# Patient Record
Sex: MALE | Race: OTHER | NOT HISPANIC OR LATINO | URBAN - METROPOLITAN AREA
[De-identification: names, ages, dates, MRNs, and addresses within clinical notes are randomized per-mention and may not be internally consistent; named-entity substitution may affect disease eponyms.]

---

## 2018-10-19 ENCOUNTER — INPATIENT (INPATIENT)
Facility: HOSPITAL | Age: 38
LOS: 1 days | Discharge: HOME | End: 2018-10-21
Attending: INTERNAL MEDICINE | Admitting: INTERNAL MEDICINE
Payer: MEDICAID

## 2018-10-19 VITALS
HEART RATE: 126 BPM | TEMPERATURE: 98 F | OXYGEN SATURATION: 100 % | RESPIRATION RATE: 26 BRPM | SYSTOLIC BLOOD PRESSURE: 139 MMHG | DIASTOLIC BLOOD PRESSURE: 84 MMHG

## 2018-10-19 DIAGNOSIS — J45.901 UNSPECIFIED ASTHMA WITH (ACUTE) EXACERBATION: ICD-10-CM

## 2018-10-19 LAB
ALBUMIN SERPL ELPH-MCNC: 4.6 G/DL — SIGNIFICANT CHANGE UP (ref 3.3–5)
ALP SERPL-CCNC: 84 U/L — SIGNIFICANT CHANGE UP (ref 40–120)
ALT FLD-CCNC: 35 U/L — SIGNIFICANT CHANGE UP (ref 4–41)
ANISOCYTOSIS BLD QL: SLIGHT — SIGNIFICANT CHANGE UP
APTT BLD: 30.6 SEC — SIGNIFICANT CHANGE UP (ref 27.5–37.4)
AST SERPL-CCNC: 21 U/L — SIGNIFICANT CHANGE UP (ref 4–40)
B PERT DNA SPEC QL NAA+PROBE: SIGNIFICANT CHANGE UP
BASE EXCESS BLDV CALC-SCNC: -0.7 MMOL/L — SIGNIFICANT CHANGE UP
BASE EXCESS BLDV CALC-SCNC: 2.3 MMOL/L — SIGNIFICANT CHANGE UP
BASOPHILS # BLD AUTO: 0.03 K/UL — SIGNIFICANT CHANGE UP (ref 0–0.2)
BASOPHILS NFR BLD AUTO: 0.2 % — SIGNIFICANT CHANGE UP (ref 0–2)
BASOPHILS NFR SPEC: 0 % — SIGNIFICANT CHANGE UP (ref 0–2)
BILIRUB SERPL-MCNC: 0.3 MG/DL — SIGNIFICANT CHANGE UP (ref 0.2–1.2)
BLASTS # FLD: 0 % — SIGNIFICANT CHANGE UP (ref 0–0)
BLOOD GAS VENOUS - CREATININE: 0.77 MG/DL — SIGNIFICANT CHANGE UP (ref 0.5–1.3)
BLOOD GAS VENOUS - CREATININE: 0.81 MG/DL — SIGNIFICANT CHANGE UP (ref 0.5–1.3)
BUN SERPL-MCNC: 10 MG/DL — SIGNIFICANT CHANGE UP (ref 7–23)
C PNEUM DNA SPEC QL NAA+PROBE: NOT DETECTED — SIGNIFICANT CHANGE UP
CALCIUM SERPL-MCNC: 9.9 MG/DL — SIGNIFICANT CHANGE UP (ref 8.4–10.5)
CHLORIDE BLDV-SCNC: 107 MMOL/L — SIGNIFICANT CHANGE UP (ref 96–108)
CHLORIDE BLDV-SCNC: 109 MMOL/L — HIGH (ref 96–108)
CHLORIDE SERPL-SCNC: 99 MMOL/L — SIGNIFICANT CHANGE UP (ref 98–107)
CO2 SERPL-SCNC: 24 MMOL/L — SIGNIFICANT CHANGE UP (ref 22–31)
CREAT SERPL-MCNC: 0.91 MG/DL — SIGNIFICANT CHANGE UP (ref 0.5–1.3)
EOSINOPHIL # BLD AUTO: 0.01 K/UL — SIGNIFICANT CHANGE UP (ref 0–0.5)
EOSINOPHIL NFR BLD AUTO: 0.1 % — SIGNIFICANT CHANGE UP (ref 0–6)
EOSINOPHIL NFR FLD: 0.9 % — SIGNIFICANT CHANGE UP (ref 0–6)
FLUAV H1 2009 PAND RNA SPEC QL NAA+PROBE: NOT DETECTED — SIGNIFICANT CHANGE UP
FLUAV H1 RNA SPEC QL NAA+PROBE: NOT DETECTED — SIGNIFICANT CHANGE UP
FLUAV H3 RNA SPEC QL NAA+PROBE: NOT DETECTED — SIGNIFICANT CHANGE UP
FLUAV SUBTYP SPEC NAA+PROBE: SIGNIFICANT CHANGE UP
FLUBV RNA SPEC QL NAA+PROBE: NOT DETECTED — SIGNIFICANT CHANGE UP
GAS PNL BLDV: 135 MMOL/L — LOW (ref 136–146)
GAS PNL BLDV: 136 MMOL/L — SIGNIFICANT CHANGE UP (ref 136–146)
GLUCOSE BLDV-MCNC: 126 — HIGH (ref 70–99)
GLUCOSE BLDV-MCNC: 149 — HIGH (ref 70–99)
GLUCOSE SERPL-MCNC: 121 MG/DL — HIGH (ref 70–99)
HADV DNA SPEC QL NAA+PROBE: NOT DETECTED — SIGNIFICANT CHANGE UP
HCO3 BLDV-SCNC: 23 MMOL/L — SIGNIFICANT CHANGE UP (ref 20–27)
HCO3 BLDV-SCNC: 24 MMOL/L — SIGNIFICANT CHANGE UP (ref 20–27)
HCOV 229E RNA SPEC QL NAA+PROBE: NOT DETECTED — SIGNIFICANT CHANGE UP
HCOV HKU1 RNA SPEC QL NAA+PROBE: NOT DETECTED — SIGNIFICANT CHANGE UP
HCOV NL63 RNA SPEC QL NAA+PROBE: NOT DETECTED — SIGNIFICANT CHANGE UP
HCOV OC43 RNA SPEC QL NAA+PROBE: NOT DETECTED — SIGNIFICANT CHANGE UP
HCT VFR BLD CALC: 48.5 % — SIGNIFICANT CHANGE UP (ref 39–50)
HCT VFR BLDV CALC: 45.5 % — SIGNIFICANT CHANGE UP (ref 39–51)
HCT VFR BLDV CALC: 51.5 % — HIGH (ref 39–51)
HGB BLD-MCNC: 16.2 G/DL — SIGNIFICANT CHANGE UP (ref 13–17)
HGB BLDV-MCNC: 14.9 G/DL — SIGNIFICANT CHANGE UP (ref 13–17)
HGB BLDV-MCNC: 16.8 G/DL — SIGNIFICANT CHANGE UP (ref 13–17)
HMPV RNA SPEC QL NAA+PROBE: NOT DETECTED — SIGNIFICANT CHANGE UP
HPIV1 RNA SPEC QL NAA+PROBE: NOT DETECTED — SIGNIFICANT CHANGE UP
HPIV2 RNA SPEC QL NAA+PROBE: NOT DETECTED — SIGNIFICANT CHANGE UP
HPIV3 RNA SPEC QL NAA+PROBE: NOT DETECTED — SIGNIFICANT CHANGE UP
HPIV4 RNA SPEC QL NAA+PROBE: NOT DETECTED — SIGNIFICANT CHANGE UP
IMM GRANULOCYTES # BLD AUTO: 0.06 # — SIGNIFICANT CHANGE UP
IMM GRANULOCYTES NFR BLD AUTO: 0.4 % — SIGNIFICANT CHANGE UP (ref 0–1.5)
INR BLD: 1.11 — SIGNIFICANT CHANGE UP (ref 0.88–1.17)
LACTATE BLDV-MCNC: 4 MMOL/L — CRITICAL HIGH (ref 0.5–2)
LACTATE BLDV-MCNC: 4.1 MMOL/L — CRITICAL HIGH (ref 0.5–2)
LYMPHOCYTES # BLD AUTO: 0.36 K/UL — LOW (ref 1–3.3)
LYMPHOCYTES # BLD AUTO: 2.4 % — LOW (ref 13–44)
LYMPHOCYTES NFR SPEC AUTO: 1.8 % — LOW (ref 13–44)
M PNEUMO DNA SPEC QL NAA+PROBE: NOT DETECTED — SIGNIFICANT CHANGE UP
MACROCYTES BLD QL: SLIGHT — SIGNIFICANT CHANGE UP
MCHC RBC-ENTMCNC: 30 PG — SIGNIFICANT CHANGE UP (ref 27–34)
MCHC RBC-ENTMCNC: 33.4 % — SIGNIFICANT CHANGE UP (ref 32–36)
MCV RBC AUTO: 89.8 FL — SIGNIFICANT CHANGE UP (ref 80–100)
METAMYELOCYTES # FLD: 0 % — SIGNIFICANT CHANGE UP (ref 0–1)
MONOCYTES # BLD AUTO: 0.18 K/UL — SIGNIFICANT CHANGE UP (ref 0–0.9)
MONOCYTES NFR BLD AUTO: 1.2 % — LOW (ref 2–14)
MONOCYTES NFR BLD: 1.7 % — LOW (ref 2–9)
MYELOCYTES NFR BLD: 0 % — SIGNIFICANT CHANGE UP (ref 0–0)
NEUTROPHIL AB SER-ACNC: 92.2 % — HIGH (ref 43–77)
NEUTROPHILS # BLD AUTO: 14.61 K/UL — HIGH (ref 1.8–7.4)
NEUTROPHILS NFR BLD AUTO: 95.7 % — HIGH (ref 43–77)
NEUTS BAND # BLD: 1.7 % — SIGNIFICANT CHANGE UP (ref 0–6)
NRBC # FLD: 0 — SIGNIFICANT CHANGE UP
OTHER - HEMATOLOGY %: 0 — SIGNIFICANT CHANGE UP
PCO2 BLDV: 49 MMHG — SIGNIFICANT CHANGE UP (ref 41–51)
PCO2 BLDV: 53 MMHG — HIGH (ref 41–51)
PH BLDV: 7.32 PH — SIGNIFICANT CHANGE UP (ref 7.32–7.43)
PH BLDV: 7.34 PH — SIGNIFICANT CHANGE UP (ref 7.32–7.43)
PLATELET # BLD AUTO: 383 K/UL — SIGNIFICANT CHANGE UP (ref 150–400)
PLATELET COUNT - ESTIMATE: NORMAL — SIGNIFICANT CHANGE UP
PMV BLD: 9.7 FL — SIGNIFICANT CHANGE UP (ref 7–13)
PO2 BLDV: 39 MMHG — SIGNIFICANT CHANGE UP (ref 35–40)
PO2 BLDV: 50 MMHG — HIGH (ref 35–40)
POTASSIUM BLDV-SCNC: 3.8 MMOL/L — SIGNIFICANT CHANGE UP (ref 3.4–4.5)
POTASSIUM BLDV-SCNC: 3.8 MMOL/L — SIGNIFICANT CHANGE UP (ref 3.4–4.5)
POTASSIUM SERPL-MCNC: 4.1 MMOL/L — SIGNIFICANT CHANGE UP (ref 3.5–5.3)
POTASSIUM SERPL-SCNC: 4.1 MMOL/L — SIGNIFICANT CHANGE UP (ref 3.5–5.3)
PROMYELOCYTES # FLD: 0 % — SIGNIFICANT CHANGE UP (ref 0–0)
PROT SERPL-MCNC: 8 G/DL — SIGNIFICANT CHANGE UP (ref 6–8.3)
PROTHROM AB SERPL-ACNC: 12.3 SEC — SIGNIFICANT CHANGE UP (ref 9.8–13.1)
RBC # BLD: 5.4 M/UL — SIGNIFICANT CHANGE UP (ref 4.2–5.8)
RBC # FLD: 12.8 % — SIGNIFICANT CHANGE UP (ref 10.3–14.5)
RSV RNA SPEC QL NAA+PROBE: NOT DETECTED — SIGNIFICANT CHANGE UP
RV+EV RNA SPEC QL NAA+PROBE: NOT DETECTED — SIGNIFICANT CHANGE UP
SAO2 % BLDV: 68 % — SIGNIFICANT CHANGE UP (ref 60–85)
SAO2 % BLDV: 79.9 % — SIGNIFICANT CHANGE UP (ref 60–85)
SODIUM SERPL-SCNC: 140 MMOL/L — SIGNIFICANT CHANGE UP (ref 135–145)
VARIANT LYMPHS # BLD: 1.7 % — SIGNIFICANT CHANGE UP
WBC # BLD: 15.25 K/UL — HIGH (ref 3.8–10.5)
WBC # FLD AUTO: 15.25 K/UL — HIGH (ref 3.8–10.5)

## 2018-10-19 PROCEDURE — 99223 1ST HOSP IP/OBS HIGH 75: CPT | Mod: GC

## 2018-10-19 PROCEDURE — 71046 X-RAY EXAM CHEST 2 VIEWS: CPT | Mod: 26

## 2018-10-19 RX ORDER — EPINEPHRINE 0.3 MG/.3ML
0.3 INJECTION INTRAMUSCULAR; SUBCUTANEOUS ONCE
Qty: 0 | Refills: 0 | Status: COMPLETED | OUTPATIENT
Start: 2018-10-19 | End: 2018-10-19

## 2018-10-19 RX ORDER — INFLUENZA VIRUS VACCINE 15; 15; 15; 15 UG/.5ML; UG/.5ML; UG/.5ML; UG/.5ML
0.5 SUSPENSION INTRAMUSCULAR ONCE
Qty: 0 | Refills: 0 | Status: DISCONTINUED | OUTPATIENT
Start: 2018-10-19 | End: 2018-10-21

## 2018-10-19 RX ORDER — ALBUTEROL 90 UG/1
2.5 AEROSOL, METERED ORAL ONCE
Qty: 0 | Refills: 0 | Status: COMPLETED | OUTPATIENT
Start: 2018-10-19 | End: 2018-10-19

## 2018-10-19 RX ORDER — MAGNESIUM SULFATE 500 MG/ML
2 VIAL (ML) INJECTION ONCE
Qty: 0 | Refills: 0 | Status: COMPLETED | OUTPATIENT
Start: 2018-10-19 | End: 2018-10-19

## 2018-10-19 RX ORDER — IPRATROPIUM/ALBUTEROL SULFATE 18-103MCG
3 AEROSOL WITH ADAPTER (GRAM) INHALATION EVERY 6 HOURS
Qty: 0 | Refills: 0 | Status: DISCONTINUED | OUTPATIENT
Start: 2018-10-19 | End: 2018-10-19

## 2018-10-19 RX ORDER — ENOXAPARIN SODIUM 100 MG/ML
40 INJECTION SUBCUTANEOUS DAILY
Qty: 0 | Refills: 0 | Status: DISCONTINUED | OUTPATIENT
Start: 2018-10-19 | End: 2018-10-21

## 2018-10-19 RX ORDER — IPRATROPIUM/ALBUTEROL SULFATE 18-103MCG
3 AEROSOL WITH ADAPTER (GRAM) INHALATION EVERY 4 HOURS
Qty: 0 | Refills: 0 | Status: DISCONTINUED | OUTPATIENT
Start: 2018-10-19 | End: 2018-10-21

## 2018-10-19 RX ORDER — IPRATROPIUM/ALBUTEROL SULFATE 18-103MCG
3 AEROSOL WITH ADAPTER (GRAM) INHALATION
Qty: 0 | Refills: 0 | Status: COMPLETED | OUTPATIENT
Start: 2018-10-19 | End: 2018-10-19

## 2018-10-19 RX ORDER — SODIUM CHLORIDE 9 MG/ML
1000 INJECTION INTRAMUSCULAR; INTRAVENOUS; SUBCUTANEOUS ONCE
Qty: 0 | Refills: 0 | Status: COMPLETED | OUTPATIENT
Start: 2018-10-19 | End: 2018-10-19

## 2018-10-19 RX ORDER — TIOTROPIUM BROMIDE 18 UG/1
1 CAPSULE ORAL; RESPIRATORY (INHALATION) DAILY
Qty: 0 | Refills: 0 | Status: DISCONTINUED | OUTPATIENT
Start: 2018-10-19 | End: 2018-10-21

## 2018-10-19 RX ORDER — BUDESONIDE AND FORMOTEROL FUMARATE DIHYDRATE 160; 4.5 UG/1; UG/1
2 AEROSOL RESPIRATORY (INHALATION)
Qty: 0 | Refills: 0 | Status: DISCONTINUED | OUTPATIENT
Start: 2018-10-19 | End: 2018-10-21

## 2018-10-19 RX ORDER — SODIUM CHLORIDE 0.65 %
1 AEROSOL, SPRAY (ML) NASAL
Qty: 0 | Refills: 0 | Status: DISCONTINUED | OUTPATIENT
Start: 2018-10-19 | End: 2018-10-21

## 2018-10-19 RX ORDER — ALBUTEROL 90 UG/1
1 AEROSOL, METERED ORAL EVERY 4 HOURS
Qty: 0 | Refills: 0 | Status: DISCONTINUED | OUTPATIENT
Start: 2018-10-19 | End: 2018-10-21

## 2018-10-19 RX ADMIN — BUDESONIDE AND FORMOTEROL FUMARATE DIHYDRATE 2 PUFF(S): 160; 4.5 AEROSOL RESPIRATORY (INHALATION) at 13:18

## 2018-10-19 RX ADMIN — Medication 3 MILLILITER(S): at 04:24

## 2018-10-19 RX ADMIN — Medication 40 MILLIGRAM(S): at 17:23

## 2018-10-19 RX ADMIN — ENOXAPARIN SODIUM 40 MILLIGRAM(S): 100 INJECTION SUBCUTANEOUS at 13:17

## 2018-10-19 RX ADMIN — SODIUM CHLORIDE 3000 MILLILITER(S): 9 INJECTION INTRAMUSCULAR; INTRAVENOUS; SUBCUTANEOUS at 05:55

## 2018-10-19 RX ADMIN — SODIUM CHLORIDE 1000 MILLILITER(S): 9 INJECTION INTRAMUSCULAR; INTRAVENOUS; SUBCUTANEOUS at 04:23

## 2018-10-19 RX ADMIN — Medication 3 MILLILITER(S): at 10:01

## 2018-10-19 RX ADMIN — Medication 2 GRAM(S): at 05:52

## 2018-10-19 RX ADMIN — SODIUM CHLORIDE 1000 MILLILITER(S): 9 INJECTION INTRAMUSCULAR; INTRAVENOUS; SUBCUTANEOUS at 07:03

## 2018-10-19 RX ADMIN — Medication 3 MILLILITER(S): at 01:17

## 2018-10-19 RX ADMIN — Medication 3 MILLILITER(S): at 01:38

## 2018-10-19 RX ADMIN — ALBUTEROL 2.5 MILLIGRAM(S): 90 AEROSOL, METERED ORAL at 04:23

## 2018-10-19 RX ADMIN — Medication 3 MILLILITER(S): at 13:14

## 2018-10-19 RX ADMIN — Medication 125 MILLIGRAM(S): at 01:20

## 2018-10-19 RX ADMIN — BUDESONIDE AND FORMOTEROL FUMARATE DIHYDRATE 2 PUFF(S): 160; 4.5 AEROSOL RESPIRATORY (INHALATION) at 21:24

## 2018-10-19 RX ADMIN — Medication 3 MILLILITER(S): at 21:20

## 2018-10-19 RX ADMIN — SODIUM CHLORIDE 1000 MILLILITER(S): 9 INJECTION INTRAMUSCULAR; INTRAVENOUS; SUBCUTANEOUS at 05:52

## 2018-10-19 RX ADMIN — Medication 50 GRAM(S): at 04:07

## 2018-10-19 NOTE — ED ADULT TRIAGE NOTE - CHIEF COMPLAINT QUOTE
pt arrives c/o severe chest tightness for past two hours. pt is sweaty and clammy , looking pale. Says he has PMH of Asthma and had SOB and wheezing for past two days. He had been taking Salbutamol inhaler at home. pt placed on oxygen. EKG obtained and is brought directly to room 25.

## 2018-10-19 NOTE — DISCHARGE NOTE ADULT - MEDICATION SUMMARY - MEDICATIONS TO TAKE
I will START or STAY ON the medications listed below when I get home from the hospital:    predniSONE 20 mg oral tablet  -- 2 tab(s) by mouth once a day  -- Indication: For Asthma exacerbation    budesonide-formoterol 160 mcg-4.5 mcg/inh inhalation aerosol  -- 1 puff(s) inhaled 2 times a day   -- Indication: For Asthma exacerbation    albuterol 1.25 mg/3 mL (0.042%) inhalation solution  -- 3 milliliter(s) by nebulizer 3 times a day, As Needed for wheezing  -- For inhalation only.  It is very important that you take or use this exactly as directed.  Do not skip doses or discontinue unless directed by your doctor.  Obtain medical advice before taking any non-prescription drugs as some may affect the action of this medication.    -- Indication: For Asthma exacerbation    guaiFENesin 1200 mg oral tablet, extended release  -- 1 tab(s) by mouth every 12 hours  -- Indication: For Asthma exacerbation    montelukast 10 mg oral tablet  -- 1 tab(s) by mouth once a day  -- Indication: For Asthma exacerbation

## 2018-10-19 NOTE — DISCHARGE NOTE ADULT - CARE PLAN
Principal Discharge DX:	Moderate asthma with exacerbation, unspecified whether persistent Principal Discharge DX:	Moderate asthma with exacerbation, unspecified whether persistent  Goal:	Decreased wheezing  Assessment and plan of treatment:	You came into the hospital with wheezing and shortness of breath. You were treated for asthma exacerbation. You will be contacted to follow up with pulmonary doctors within 1 week of discharge

## 2018-10-19 NOTE — H&P ADULT - NSHPPHYSICALEXAM_GEN_ALL_CORE
GEN: NAD, speaking in full sentences, on BiPAP  HEENT: NC/AT, anicteric  Neck: No LAD  Pulmonary: Diffuse wheezing, no accessory muscle use  Cardiac: Tachycardic, regular, no murmurs appreciated  GI: soft, ntnd  Ext: No c/c/e  Neuro: Non-focal  Psych: appropriate affect

## 2018-10-19 NOTE — ED ADULT NURSE REASSESSMENT NOTE - NS ED NURSE REASSESS COMMENT FT1
flodahlia RN: Pt noted to be tachypneic; however pt denies SOB, difficulty breathing, chest pain; no signs of respiratory distress noted at this time. Pt is able to speak full clear sentences.

## 2018-10-19 NOTE — ED PROVIDER NOTE - MEDICAL DECISION MAKING DETAILS
moderate asthma exacerabtion w/ minimal improvement with nebs. improved with mg & bipap. will adm rcu

## 2018-10-19 NOTE — DISCHARGE NOTE ADULT - PLAN OF CARE
Decreased wheezing You came into the hospital with wheezing and shortness of breath. You were treated for asthma exacerbation. You will be contacted to follow up with pulmonary doctors within 1 week of discharge

## 2018-10-19 NOTE — ED PROVIDER NOTE - CARE PLAN
Principal Discharge DX:	Moderate asthma with exacerbation, unspecified whether persistent  Secondary Diagnosis:	URI (upper respiratory infection) Principal Discharge DX:	Moderate asthma with exacerbation, unspecified whether persistent  Secondary Diagnosis:	URI (upper respiratory infection)  Secondary Diagnosis:	Lactic acidosis

## 2018-10-19 NOTE — H&P ADULT - ASSESSMENT
36 yo male with a PMhx of asthma who presents with SOB and wheezing 2/2 acute asthma exacerbation requiring BiPAP for work of breathing    #Asthma exacerbation:  Patient with known history of asthma on unclear controller agent presents with asthma exacerbation requiring BiPAP for increased work of breathing. Possible triggers include recent viral infection vs change in weather. RVP negative; CXR clear. Labs with leukocytosis (after receiving steroids) but no clear evidence of bacterial infection--will monitor off abx.  -Solumedrol 40mg daily for now  -Duonebs Q6H PRN  -Albuterol Q2H PRN  -Start ICS/LABA  -BiPAP QHS for now  -Will need outpatient follow up    #Lactic acidosis  Suspect B-type lactic acidosis from IM epinephrine and albuterol,  -Repeat lactate    #VTE PPX: Lovenox    Barak Wilhelm MD, PGY4  Pulmonary and Critical Care Fellow  [phone]

## 2018-10-19 NOTE — DISCHARGE NOTE ADULT - PATIENT PORTAL LINK FT
You can access the [email or web address] Patient Portal, offered by [facility], by registering with the following website: [email or web address]

## 2018-10-19 NOTE — H&P ADULT - HISTORY OF PRESENT ILLNESS
38 yo male with a PMhx of asthma which has been well controlled over the last 6 years who presents with2-3 days of SOB and wheezing which he attributes to cold weather. Patient notes diagnosis of childhood asthma for which he takes an inhaler (unsure of the type and he gets his medications from [facility] due to the cost). He reports he does not like using a rescue inhaler because it makes him jittery. Over the last 2-3 days he notes increasing SOB and wheezing a/w a dry cough with minimal exertion. He notes that his "glands" in his neck were swollen several days ago and a runny nose but denies any fevers, chills, other URI symptoms, sputum production, n/v/d/c, abdominal pain, recent travel, sick contacts, LE edema. He reports that he stopped smoking both cigarettes and marijuana about 10 days ago. He has had prior ICU stays (last 6 years ago), but has never been intubated or required BiPAP.     In the ED he was given solumedrol, EpI IM, Duonebs, Albuterol, Mg. He was placed on BiPAP for increased work of breathing

## 2018-10-19 NOTE — H&P ADULT - NSHPSOCIALHISTORY_GEN_ALL_CORE
Born in   Occasional cigarettes and marijuana use (denies smoking more than a few cigarettes a day and reports stopping 10 days ago)  Etoh - previously drank 1 beer a day max; stopped etoh completely 10 days prior  Works in "[employer]

## 2018-10-19 NOTE — ED PROVIDER NOTE - PROGRESS NOTE DETAILS
Satnam: Pt speaking in full sentences, still wheezing and tacypneic, will give magnesium and continue tor reassess. Satnam: Pt still wheezing and tachypneic. Pt becomes significantly distressed with ambulation. magnesium running, will continue to reassess. Satnam: Pt tolerating bipap well. clinically improved. admitted to PCU awaiting a bed.

## 2018-10-19 NOTE — ED PROVIDER NOTE - ATTENDING CONTRIBUTION TO CARE
Satnam: 38 yo male with a h/o asthma- previous ICU admissions but no Bipap or intubations c/o non productive cough and SOB that began yesterday. + associated nasal congestion. No chest pain, LE pain or edema. No recent travel, immobilization or known sick contacts. Exam: GENERAL: well appearing, NAD, HEENT: MMM, PERRLA, CARDIO: + regular tachycardia, LUNGS: diffuse wheezing and atchypnea, but speaking in full sentences and no accessory muscle use. ABD: soft, nontender, BSx4 quadrants, no guarding or rigidity. EXT: No LE edema or calf TTP, 2+ distal pulses x 4 extremities. NEURO: AxOx3, SKIN: no rashes or lesions, well perfused A/P- 38 yo male with asthma exacerbation. will give steroids, nebs and reassess.

## 2018-10-19 NOTE — ED ADULT NURSE NOTE - OBJECTIVE STATEMENT
karl RN: pt received to room 25. complains of sob and chest tightness for past 2 hours, not relieved by home inhaler. pt tachycardic and tachypneic. O2 sat 100% on arrival to room. MD at bedside for eval. medicated as ordered. awaits xray in no acute distress. report given to WILMA Juarez.

## 2018-10-19 NOTE — ED PROVIDER NOTE - OBJECTIVE STATEMENT
36 yo male with a h/o asthma- previous ICU admissions but no Bipap or intubations c/o non productive cough and SOB that began yesterday. + associated nasal congestion. ROS negative for: fever, chest pain, Nausea, vomiting, diarrhea, abdominal pain,LE pain or edema, recent travel, immobilization or known sick contacts. stopped smoking cigarettes 1 week ago

## 2018-10-19 NOTE — ED ADULT NURSE NOTE - NSIMPLEMENTINTERV_GEN_ALL_ED
Implemented All Universal Safety Interventions:  Leonidas to call system. Call bell, personal items and telephone within reach. Instruct patient to call for assistance. Room bathroom lighting operational. Non-slip footwear when patient is off stretcher. Physically safe environment: no spills, clutter or unnecessary equipment. Stretcher in lowest position, wheels locked, appropriate side rails in place.

## 2018-10-19 NOTE — DISCHARGE NOTE ADULT - HOSPITAL COURSE
38 yo male with a h/o asthma- previous ICU admissions but no Bipap or intubations c/o non productive cough and SOB that began yesterday.  Initially required BIPAP for WOB.  Steroids, nebs. 36 yo male with a h/o asthma- previous ICU admissions but no Bipap or intubations c/o non productive cough and SOB that began yesterday.  Initially required BIPAP for WOB.Pt was treated with steroids and duonebs. Pt improved and will follow up with pulmonary team.

## 2018-10-19 NOTE — DISCHARGE NOTE ADULT - PROVIDER TOKENS
FREE:[LAST:[pulmonary clinic],PHONE:[phone]],FAX:[(   [phone]    -],ADDRESS:[[address] [place] [address] [address] [place] [address]]]

## 2018-10-19 NOTE — ED ADULT NURSE REASSESSMENT NOTE - NS ED NURSE REASSESS COMMENT FT1
Jenna RN: Pt is resting and appears in NAD. Pt still on BIPAP at 30%. Pt denies SOB, difficulty breathing, or chest pain at this time.

## 2018-10-20 DIAGNOSIS — E87.2 ACIDOSIS: ICD-10-CM

## 2018-10-20 DIAGNOSIS — J45.901 UNSPECIFIED ASTHMA WITH (ACUTE) EXACERBATION: ICD-10-CM

## 2018-10-20 DIAGNOSIS — Z29.9 ENCOUNTER FOR PROPHYLACTIC MEASURES, UNSPECIFIED: ICD-10-CM

## 2018-10-20 LAB
BUN SERPL-MCNC: 15 MG/DL — SIGNIFICANT CHANGE UP (ref 7–23)
CALCIUM SERPL-MCNC: 9.2 MG/DL — SIGNIFICANT CHANGE UP (ref 8.4–10.5)
CHLORIDE SERPL-SCNC: 101 MMOL/L — SIGNIFICANT CHANGE UP (ref 98–107)
CO2 SERPL-SCNC: 22 MMOL/L — SIGNIFICANT CHANGE UP (ref 22–31)
CREAT SERPL-MCNC: 0.86 MG/DL — SIGNIFICANT CHANGE UP (ref 0.5–1.3)
GLUCOSE SERPL-MCNC: 116 MG/DL — HIGH (ref 70–99)
HCT VFR BLD CALC: 42.5 % — SIGNIFICANT CHANGE UP (ref 39–50)
HGB BLD-MCNC: 14 G/DL — SIGNIFICANT CHANGE UP (ref 13–17)
LACTATE SERPL-SCNC: 2.8 MMOL/L — HIGH (ref 0.5–2)
MCHC RBC-ENTMCNC: 30.6 PG — SIGNIFICANT CHANGE UP (ref 27–34)
MCHC RBC-ENTMCNC: 32.9 % — SIGNIFICANT CHANGE UP (ref 32–36)
MCV RBC AUTO: 92.8 FL — SIGNIFICANT CHANGE UP (ref 80–100)
NRBC # FLD: 0 — SIGNIFICANT CHANGE UP
PLATELET # BLD AUTO: 363 K/UL — SIGNIFICANT CHANGE UP (ref 150–400)
PMV BLD: 10.1 FL — SIGNIFICANT CHANGE UP (ref 7–13)
POTASSIUM SERPL-MCNC: 3.7 MMOL/L — SIGNIFICANT CHANGE UP (ref 3.5–5.3)
POTASSIUM SERPL-SCNC: 3.7 MMOL/L — SIGNIFICANT CHANGE UP (ref 3.5–5.3)
RBC # BLD: 4.58 M/UL — SIGNIFICANT CHANGE UP (ref 4.2–5.8)
RBC # FLD: 13.2 % — SIGNIFICANT CHANGE UP (ref 10.3–14.5)
SODIUM SERPL-SCNC: 139 MMOL/L — SIGNIFICANT CHANGE UP (ref 135–145)
WBC # BLD: 15.48 K/UL — HIGH (ref 3.8–10.5)
WBC # FLD AUTO: 15.48 K/UL — HIGH (ref 3.8–10.5)

## 2018-10-20 PROCEDURE — 99233 SBSQ HOSP IP/OBS HIGH 50: CPT | Mod: GC

## 2018-10-20 RX ORDER — MONTELUKAST 4 MG/1
10 TABLET, CHEWABLE ORAL DAILY
Qty: 0 | Refills: 0 | Status: DISCONTINUED | OUTPATIENT
Start: 2018-10-20 | End: 2018-10-21

## 2018-10-20 RX ORDER — CHLORHEXIDINE GLUCONATE 213 G/1000ML
1 SOLUTION TOPICAL
Qty: 0 | Refills: 0 | Status: DISCONTINUED | OUTPATIENT
Start: 2018-10-20 | End: 2018-10-21

## 2018-10-20 RX ADMIN — ENOXAPARIN SODIUM 40 MILLIGRAM(S): 100 INJECTION SUBCUTANEOUS at 11:35

## 2018-10-20 RX ADMIN — CHLORHEXIDINE GLUCONATE 1 APPLICATION(S): 213 SOLUTION TOPICAL at 23:19

## 2018-10-20 RX ADMIN — Medication 1 SPRAY(S): at 11:35

## 2018-10-20 RX ADMIN — Medication 3 MILLILITER(S): at 21:27

## 2018-10-20 RX ADMIN — Medication 3 MILLILITER(S): at 05:30

## 2018-10-20 RX ADMIN — Medication 3 MILLILITER(S): at 17:04

## 2018-10-20 RX ADMIN — Medication 200 MILLIGRAM(S): at 11:35

## 2018-10-20 RX ADMIN — BUDESONIDE AND FORMOTEROL FUMARATE DIHYDRATE 2 PUFF(S): 160; 4.5 AEROSOL RESPIRATORY (INHALATION) at 10:12

## 2018-10-20 RX ADMIN — MONTELUKAST 10 MILLIGRAM(S): 4 TABLET, CHEWABLE ORAL at 11:40

## 2018-10-20 RX ADMIN — Medication 3 MILLILITER(S): at 01:30

## 2018-10-20 RX ADMIN — Medication 40 MILLIGRAM(S): at 06:00

## 2018-10-20 RX ADMIN — Medication 1 SPRAY(S): at 02:12

## 2018-10-20 RX ADMIN — Medication 3 MILLILITER(S): at 10:05

## 2018-10-20 RX ADMIN — BUDESONIDE AND FORMOTEROL FUMARATE DIHYDRATE 2 PUFF(S): 160; 4.5 AEROSOL RESPIRATORY (INHALATION) at 21:28

## 2018-10-20 NOTE — PROGRESS NOTE ADULT - ASSESSMENT
36 yo male with a PMhx of asthma who presents with SOB and wheezing 2/2 acute asthma exacerbation requiring BiPAP for work of breathing    #Asthma exacerbation:  Patient with known history of asthma on unclear controller agent presents with asthma exacerbation requiring BiPAP for increased work of breathing. Possible triggers include recent viral infection vs change in weather. RVP negative; CXR clear. Labs with leukocytosis (after receiving steroids) but no clear evidence of bacterial infection--will monitor off abx.  -Solumedrol 40mg daily for now  -Duonebs Q6H PRN  -Albuterol Q2H PRN  -Start ICS/LABA  -BiPAP QHS for now  -Will need outpatient follow up    #Lactic acidosis  Suspect B-type lactic acidosis from IM epinephrine and albuterol,  -Repeat lactate    #VTE PPX: Lovenox    Barak Wilhelm MD, PGY4  Pulmonary and Critical Care Fellow  [phone] 38 yo male with a PMhx of asthma who presents with SOB and wheezing 2/2 acute asthma exacerbation requiring BiPAP for work of breathing

## 2018-10-20 NOTE — PROGRESS NOTE ADULT - SUBJECTIVE AND OBJECTIVE BOX
Patient is a 37y old  Male who presents with a chief complaint of Asthma exacerbation (19 Oct 2018 16:04)        SUBJECTIVE / OVERNIGHT EVENTS:      MEDICATIONS  (STANDING):  ALBUTerol    90 MICROgram(s) HFA Inhaler 1 Puff(s) Inhalation every 4 hours  ALBUTerol/ipratropium for Nebulization 3 milliLiter(s) Nebulizer every 4 hours  buDESOnide 160 MICROgram(s)/formoterol 4.5 MICROgram(s) Inhaler 2 Puff(s) Inhalation two times a day  enoxaparin Injectable 40 milliGRAM(s) SubCutaneous daily  influenza   Vaccine 0.5 milliLiter(s) IntraMuscular once  methylPREDNISolone sodium succinate Injectable 40 milliGRAM(s) IV Push daily  tiotropium 18 MICROgram(s) Capsule 1 Capsule(s) Inhalation daily    MEDICATIONS  (PRN):  sodium chloride 0.65% Nasal 1 Spray(s) Both Nostrils four times a day PRN Nasal Congestion        CAPILLARY BLOOD GLUCOSE        I&O's Summary      PHYSICAL EXAM  GENERAL: NAD, well-developed  HEAD:  Atraumatic, Normocephalic  EYES: EOMI, PERRLA, conjunctiva and sclera clear  NECK: Supple, No JVD  CHEST/LUNG: Clear to auscultation bilaterally; No wheeze  HEART: Regular rate and rhythm; No murmurs, rubs, or gallops  ABDOMEN: Soft, Nontender, Nondistended; Bowel sounds present  EXTREMITIES:  2+ Peripheral Pulses, No clubbing, cyanosis, or edema  PSYCH: AAOx3  SKIN: No rashes or lesions    LABS:                        14.0   15.48 )-----------( 363      ( 20 Oct 2018 06:21 )             42.5     10-20    139  |  101  |  15  ----------------------------<  116<H>  3.7   |  22  |  0.86    Ca    9.2      20 Oct 2018 06:21    TPro  8.0  /  Alb  4.6  /  TBili  0.3  /  DBili  x   /  AST  21  /  ALT  35  /  AlkPhos  84  10-19    PT/INR - ( 19 Oct 2018 04:00 )   PT: 12.3 SEC;   INR: 1.11          PTT - ( 19 Oct 2018 04:00 )  PTT:30.6 SEC          RADIOLOGY & ADDITIONAL TESTS:    Imaging Personally Reviewed:  Consultant(s) Notes Reviewed:    Care Discussed with Consultants/Other Providers: Patient is a 37y old  Male who presents with a chief complaint of Asthma exacerbation (19 Oct 2018 16:04)        SUBJECTIVE / OVERNIGHT EVENTS: No overnight events. Pt reports feeling SOB on exertion.      MEDICATIONS  (STANDING):  ALBUTerol    90 MICROgram(s) HFA Inhaler 1 Puff(s) Inhalation every 4 hours  ALBUTerol/ipratropium for Nebulization 3 milliLiter(s) Nebulizer every 4 hours  buDESOnide 160 MICROgram(s)/formoterol 4.5 MICROgram(s) Inhaler 2 Puff(s) Inhalation two times a day  enoxaparin Injectable 40 milliGRAM(s) SubCutaneous daily  influenza   Vaccine 0.5 milliLiter(s) IntraMuscular once  methylPREDNISolone sodium succinate Injectable 40 milliGRAM(s) IV Push daily  tiotropium 18 MICROgram(s) Capsule 1 Capsule(s) Inhalation daily    MEDICATIONS  (PRN):  sodium chloride 0.65% Nasal 1 Spray(s) Both Nostrils four times a day PRN Nasal Congestion        CAPILLARY BLOOD GLUCOSE        I&O's Summary      PHYSICAL EXAM  GENERAL: NAD, well-developed  HEAD:  Atraumatic, Normocephalic  EYES: EOMI, PERRLA, conjunctiva and sclera clear  NECK: Supple, No JVD  CHEST/LUNG: Mild end expiratory wheezing  HEART: Regular rate and rhythm; No murmurs, rubs, or gallops  ABDOMEN: Soft, Nontender, Nondistended; Bowel sounds present  EXTREMITIES:  2+ Peripheral Pulses, No clubbing, cyanosis, or edema  PSYCH: AAOx3  SKIN: No rashes or lesions    LABS:                        14.0   15.48 )-----------( 363      ( 20 Oct 2018 06:21 )             42.5     10-20    139  |  101  |  15  ----------------------------<  116<H>  3.7   |  22  |  0.86    Ca    9.2      20 Oct 2018 06:21    TPro  8.0  /  Alb  4.6  /  TBili  0.3  /  DBili  x   /  AST  21  /  ALT  35  /  AlkPhos  84  10-19    PT/INR - ( 19 Oct 2018 04:00 )   PT: 12.3 SEC;   INR: 1.11          PTT - ( 19 Oct 2018 04:00 )  PTT:30.6 SEC          RADIOLOGY & ADDITIONAL TESTS:    Imaging Personally Reviewed:  Consultant(s) Notes Reviewed:    Care Discussed with Consultants/Other Providers:

## 2018-10-20 NOTE — PROGRESS NOTE ADULT - PROBLEM SELECTOR PLAN 1
Patient with known history of asthma on unclear controller agent presents with asthma exacerbation requiring BiPAP for increased work of breathing. Possible triggers include recent viral infection vs change in weather. RVP negative; CXR clear. Labs with leukocytosis (after receiving steroids) but no clear evidence of bacterial infection--will monitor off abx.  -Solumedrol 40mg daily for now  -Duonebs Q6H PRN  -Albuterol Q2H PRN  -Start ICS/LABA  -BiPAP QHS for now  -Will need outpatient follow up Pt with mild end exipratory wheezing on exam this AM. Still requiring supplemental O2   -c/w Solumedrol 40mg qD. Day 2  -Duonebs Q6H PRN  -Albuterol Q2H PRN  -c/w tiotropium and symbicort.  -BiPAP QHS for now  -Will need outpatient follow up  -c/w continous o2 monitoring.

## 2018-10-21 VITALS — OXYGEN SATURATION: 95 %

## 2018-10-21 PROCEDURE — 99233 SBSQ HOSP IP/OBS HIGH 50: CPT | Mod: GC

## 2018-10-21 RX ORDER — BUDESONIDE AND FORMOTEROL FUMARATE DIHYDRATE 160; 4.5 UG/1; UG/1
1 AEROSOL RESPIRATORY (INHALATION)
Qty: 1 | Refills: 0 | OUTPATIENT
Start: 2018-10-21 | End: 2018-11-19

## 2018-10-21 RX ORDER — MONTELUKAST 4 MG/1
1 TABLET, CHEWABLE ORAL
Qty: 30 | Refills: 0 | OUTPATIENT
Start: 2018-10-21 | End: 2018-11-19

## 2018-10-21 RX ORDER — EPINEPHRINE 0.3 MG/.3ML
3 INJECTION INTRAMUSCULAR; SUBCUTANEOUS
Qty: 30 | Refills: 0 | OUTPATIENT
Start: 2018-10-21 | End: 2018-11-19

## 2018-10-21 RX ADMIN — BUDESONIDE AND FORMOTEROL FUMARATE DIHYDRATE 2 PUFF(S): 160; 4.5 AEROSOL RESPIRATORY (INHALATION) at 09:39

## 2018-10-21 RX ADMIN — Medication 1 SPRAY(S): at 02:38

## 2018-10-21 RX ADMIN — Medication 40 MILLIGRAM(S): at 06:35

## 2018-10-21 RX ADMIN — Medication 200 MILLIGRAM(S): at 09:03

## 2018-10-21 RX ADMIN — ENOXAPARIN SODIUM 40 MILLIGRAM(S): 100 INJECTION SUBCUTANEOUS at 12:47

## 2018-10-21 RX ADMIN — Medication 1200 MILLIGRAM(S): at 17:11

## 2018-10-21 RX ADMIN — Medication 3 MILLILITER(S): at 01:20

## 2018-10-21 RX ADMIN — Medication 3 MILLILITER(S): at 13:39

## 2018-10-21 RX ADMIN — Medication 3 MILLILITER(S): at 09:32

## 2018-10-21 RX ADMIN — Medication 3 MILLILITER(S): at 17:23

## 2018-10-21 RX ADMIN — Medication 200 MILLIGRAM(S): at 02:38

## 2018-10-21 RX ADMIN — Medication 40 MILLIGRAM(S): at 19:53

## 2018-10-21 RX ADMIN — Medication 1 SPRAY(S): at 06:35

## 2018-10-21 RX ADMIN — Medication 3 MILLILITER(S): at 05:19

## 2018-10-21 RX ADMIN — MONTELUKAST 10 MILLIGRAM(S): 4 TABLET, CHEWABLE ORAL at 12:47

## 2018-10-21 NOTE — PROGRESS NOTE ADULT - PROBLEM SELECTOR PLAN 1
Pt with mild end exipratory wheezing on exam this AM. Still requiring supplemental O2   -c/w Solumedrol 40mg qD. Day 3  -Duonebs Q6H PRN  -Albuterol Q2H PRN  -c/w tiotropium and symbicort.  -BiPAP QHS for now  -Will need outpatient follow up  -c/w continous o2 monitoring.

## 2018-10-21 NOTE — PROGRESS NOTE ADULT - SUBJECTIVE AND OBJECTIVE BOX
Patient is a 37y old  Male who presents with a chief complaint of Asthma exacerbation (20 Oct 2018 08:10)        SUBJECTIVE / OVERNIGHT EVENTS:      MEDICATIONS  (STANDING):  ALBUTerol    90 MICROgram(s) HFA Inhaler 1 Puff(s) Inhalation every 4 hours  ALBUTerol/ipratropium for Nebulization 3 milliLiter(s) Nebulizer every 4 hours  buDESOnide 160 MICROgram(s)/formoterol 4.5 MICROgram(s) Inhaler 2 Puff(s) Inhalation two times a day  chlorhexidine 4% Liquid 1 Application(s) Topical <User Schedule>  enoxaparin Injectable 40 milliGRAM(s) SubCutaneous daily  influenza   Vaccine 0.5 milliLiter(s) IntraMuscular once  methylPREDNISolone sodium succinate Injectable 40 milliGRAM(s) IV Push daily  montelukast 10 milliGRAM(s) Oral daily  tiotropium 18 MICROgram(s) Capsule 1 Capsule(s) Inhalation daily    MEDICATIONS  (PRN):  guaiFENesin   Syrup  (Sugar-Free) 200 milliGRAM(s) Oral every 6 hours PRN Cough  sodium chloride 0.65% Nasal 1 Spray(s) Both Nostrils four times a day PRN Nasal Congestion        CAPILLARY BLOOD GLUCOSE        I&O's Summary      PHYSICAL EXAM  GENERAL: NAD, well-developed  HEAD:  Atraumatic, Normocephalic  EYES: EOMI, PERRLA, conjunctiva and sclera clear  NECK: Supple, No JVD  CHEST/LUNG: Clear to auscultation bilaterally; No wheeze  HEART: Regular rate and rhythm; No murmurs, rubs, or gallops  ABDOMEN: Soft, Nontender, Nondistended; Bowel sounds present  EXTREMITIES:  2+ Peripheral Pulses, No clubbing, cyanosis, or edema  PSYCH: AAOx3  SKIN: No rashes or lesions    LABS:                        14.0   15.48 )-----------( 363      ( 20 Oct 2018 06:21 )             42.5     10-20    139  |  101  |  15  ----------------------------<  116<H>  3.7   |  22  |  0.86    Ca    9.2      20 Oct 2018 06:21                RADIOLOGY & ADDITIONAL TESTS:    Imaging Personally Reviewed:  Consultant(s) Notes Reviewed:    Care Discussed with Consultants/Other Providers: Patient is a 37y old  Male who presents with a chief complaint of Asthma exacerbation (20 Oct 2018 08:10)        SUBJECTIVE / OVERNIGHT EVENTS: No overnight events      MEDICATIONS  (STANDING):  ALBUTerol    90 MICROgram(s) HFA Inhaler 1 Puff(s) Inhalation every 4 hours  ALBUTerol/ipratropium for Nebulization 3 milliLiter(s) Nebulizer every 4 hours  buDESOnide 160 MICROgram(s)/formoterol 4.5 MICROgram(s) Inhaler 2 Puff(s) Inhalation two times a day  chlorhexidine 4% Liquid 1 Application(s) Topical <User Schedule>  enoxaparin Injectable 40 milliGRAM(s) SubCutaneous daily  influenza   Vaccine 0.5 milliLiter(s) IntraMuscular once  methylPREDNISolone sodium succinate Injectable 40 milliGRAM(s) IV Push daily  montelukast 10 milliGRAM(s) Oral daily  tiotropium 18 MICROgram(s) Capsule 1 Capsule(s) Inhalation daily    MEDICATIONS  (PRN):  guaiFENesin   Syrup  (Sugar-Free) 200 milliGRAM(s) Oral every 6 hours PRN Cough  sodium chloride 0.65% Nasal 1 Spray(s) Both Nostrils four times a day PRN Nasal Congestion        CAPILLARY BLOOD GLUCOSE        I&O's Summary      PHYSICAL EXAM  GENERAL: NAD, well-developed  HEAD:  Atraumatic, Normocephalic  EYES: EOMI, PERRLA, conjunctiva and sclera clear  NECK: Supple, No JVD  CHEST/LUNG: insp wheezing bilaterally  HEART: Regular rate and rhythm; No murmurs, rubs, or gallops  ABDOMEN: Soft, Nontender, Nondistended; Bowel sounds present  EXTREMITIES:  2+ Peripheral Pulses, No clubbing, cyanosis, or edema  PSYCH: AAOx3  SKIN: No rashes or lesions    LABS:                        14.0   15.48 )-----------( 363      ( 20 Oct 2018 06:21 )             42.5     10-20    139  |  101  |  15  ----------------------------<  116<H>  3.7   |  22  |  0.86    Ca    9.2      20 Oct 2018 06:21                RADIOLOGY & ADDITIONAL TESTS:    Imaging Personally Reviewed:  Consultant(s) Notes Reviewed:    Care Discussed with Consultants/Other Providers:

## 2018-10-21 NOTE — PROGRESS NOTE ADULT - ASSESSMENT
36 yo male with a PMhx of asthma who presents with SOB and wheezing 2/2 acute asthma exacerbation requiring BiPAP for work of breathing

## 2018-10-21 NOTE — PROGRESS NOTE ADULT - PROBLEM SELECTOR PLAN 3
DVT PPX:Lovenox   Dispo:Full code pending clinical improvement.
DVT PPX:Lovenox   Dispo:Full code pending clinical improvement.

## 2018-10-23 PROBLEM — J45.901 UNSPECIFIED ASTHMA WITH (ACUTE) EXACERBATION: Chronic | Status: ACTIVE | Noted: 2018-10-19

## 2018-10-31 ENCOUNTER — APPOINTMENT (OUTPATIENT)
Dept: PULMONOLOGY | Facility: CLINIC | Age: 38
End: 2018-10-31

## 2018-10-31 PROBLEM — Z00.00 ENCOUNTER FOR PREVENTIVE HEALTH EXAMINATION: Status: ACTIVE | Noted: 2018-10-31

## 2018-12-20 ENCOUNTER — EMERGENCY (EMERGENCY)
Facility: HOSPITAL | Age: 38
LOS: 1 days | Discharge: HOME | End: 2018-12-20
Attending: EMERGENCY MEDICINE | Admitting: EMERGENCY MEDICINE
Payer: MEDICAID

## 2018-12-20 VITALS
RESPIRATION RATE: 18 BRPM | TEMPERATURE: 98 F | OXYGEN SATURATION: 98 % | DIASTOLIC BLOOD PRESSURE: 191 MMHG | SYSTOLIC BLOOD PRESSURE: 132 MMHG | HEART RATE: 120 BPM

## 2018-12-20 DIAGNOSIS — J45.901 UNSPECIFIED ASTHMA WITH (ACUTE) EXACERBATION: ICD-10-CM

## 2018-12-20 LAB
BASOPHILS # BLD AUTO: 0.01 K/UL — SIGNIFICANT CHANGE UP (ref 0–0.2)
BASOPHILS NFR BLD AUTO: 0.1 % — SIGNIFICANT CHANGE UP (ref 0–2)
EOSINOPHIL # BLD AUTO: 0 K/UL — SIGNIFICANT CHANGE UP (ref 0–0.5)
EOSINOPHIL NFR BLD AUTO: 0 % — SIGNIFICANT CHANGE UP (ref 0–6)
HCT VFR BLD CALC: 50 % — SIGNIFICANT CHANGE UP (ref 39–50)
HGB BLD-MCNC: 16.8 G/DL — SIGNIFICANT CHANGE UP (ref 13–17)
IMM GRANULOCYTES # BLD AUTO: 0.03 # — SIGNIFICANT CHANGE UP
IMM GRANULOCYTES NFR BLD AUTO: 0.4 % — SIGNIFICANT CHANGE UP (ref 0–1.5)
LYMPHOCYTES # BLD AUTO: 0.62 K/UL — LOW (ref 1–3.3)
LYMPHOCYTES # BLD AUTO: 9.2 % — LOW (ref 13–44)
MCHC RBC-ENTMCNC: 30.5 PG — SIGNIFICANT CHANGE UP (ref 27–34)
MCHC RBC-ENTMCNC: 33.6 % — SIGNIFICANT CHANGE UP (ref 32–36)
MCV RBC AUTO: 90.7 FL — SIGNIFICANT CHANGE UP (ref 80–100)
MONOCYTES # BLD AUTO: 0.6 K/UL — SIGNIFICANT CHANGE UP (ref 0–0.9)
MONOCYTES NFR BLD AUTO: 8.9 % — SIGNIFICANT CHANGE UP (ref 2–14)
NEUTROPHILS # BLD AUTO: 5.49 K/UL — SIGNIFICANT CHANGE UP (ref 1.8–7.4)
NEUTROPHILS NFR BLD AUTO: 81.4 % — HIGH (ref 43–77)
NRBC # FLD: 0 — SIGNIFICANT CHANGE UP
PLATELET # BLD AUTO: 276 K/UL — SIGNIFICANT CHANGE UP (ref 150–400)
PMV BLD: 9.4 FL — SIGNIFICANT CHANGE UP (ref 7–13)
RBC # BLD: 5.51 M/UL — SIGNIFICANT CHANGE UP (ref 4.2–5.8)
RBC # FLD: 13.3 % — SIGNIFICANT CHANGE UP (ref 10.3–14.5)
WBC # BLD: 6.75 K/UL — SIGNIFICANT CHANGE UP (ref 3.8–10.5)
WBC # FLD AUTO: 6.75 K/UL — SIGNIFICANT CHANGE UP (ref 3.8–10.5)

## 2018-12-20 PROCEDURE — 99284 EMERGENCY DEPT VISIT MOD MDM: CPT

## 2018-12-20 RX ORDER — ONDANSETRON 8 MG/1
4 TABLET, FILM COATED ORAL ONCE
Qty: 0 | Refills: 0 | Status: COMPLETED | OUTPATIENT
Start: 2018-12-20 | End: 2018-12-20

## 2018-12-20 RX ORDER — SODIUM CHLORIDE 9 MG/ML
2000 INJECTION INTRAMUSCULAR; INTRAVENOUS; SUBCUTANEOUS ONCE
Qty: 0 | Refills: 0 | Status: COMPLETED | OUTPATIENT
Start: 2018-12-20 | End: 2018-12-20

## 2018-12-20 RX ORDER — ACETAMINOPHEN 500 MG
1000 TABLET ORAL ONCE
Qty: 0 | Refills: 0 | Status: COMPLETED | OUTPATIENT
Start: 2018-12-20 | End: 2018-12-20

## 2018-12-20 RX ADMIN — Medication 400 MILLIGRAM(S): at 23:20

## 2018-12-20 RX ADMIN — ONDANSETRON 4 MILLIGRAM(S): 8 TABLET, FILM COATED ORAL at 23:20

## 2018-12-20 RX ADMIN — SODIUM CHLORIDE 2000 MILLILITER(S): 9 INJECTION INTRAMUSCULAR; INTRAVENOUS; SUBCUTANEOUS at 23:20

## 2018-12-20 NOTE — ED PROVIDER NOTE - CARE PLAN
Principal Discharge DX:	Gastroenteritis  Assessment and plan of treatment:	Advance activity as tolerated.  Continue all previously prescribed medications as directed unless otherwise instructed.  Take Tylenol 650mg (Two 325 mg pills) every 4-6 hours as needed for pain or fevers.  Take Zofran 4 mg ODT every 8 hours as needed for nausea and vomiting.  Follow up with your primary care physician in 48-72 hours- bring copies of your results.  Return to the ER for worsening or persistent symptoms, including but limited to worsening/persistent pain, fevers, vomiting, bloody stools, and/or ANY NEW OR CONCERNING SYMPTOMS. If you have issues obtaining follow up, please call: [phone] to obtain a doctor or specialist who takes your insurance in your area.  You may call [phone] to make an appointment with the internal medicine clinic.

## 2018-12-20 NOTE — ED ADULT NURSE NOTE - OBJECTIVE STATEMENT
A&Ox3, ambulatory, appears in NAD. pt reports being nauseous however states he is able to control episodes of emesis. ABD is soft and appears distended with palpable pain on ULQ. Pt states he feels unsteady on feet and dizzy.

## 2018-12-20 NOTE — ED PROVIDER NOTE - NSFOLLOWUPINSTRUCTIONS_ED_ALL_ED_FT
Advance activity as tolerated.  Continue all previously prescribed medications as directed unless otherwise instructed.  Take Tylenol 650mg (Two 325 mg pills) every 4-6 hours as needed for pain or fevers.  Take Zofran 4 mg ODT every 8 hours as needed for nausea and vomiting.  Follow up with your primary care physician in 48-72 hours- bring copies of your results.  Return to the ER for worsening or persistent symptoms, including but limited to worsening/persistent pain, fevers, vomiting, bloody stools, and/or ANY NEW OR CONCERNING SYMPTOMS. If you have issues obtaining follow up, please call: [phone] to obtain a doctor or specialist who takes your insurance in your area.  You may call [phone] to make an appointment with the internal medicine clinic.

## 2018-12-20 NOTE — ED PROVIDER NOTE - PLAN OF CARE
Advance activity as tolerated.  Continue all previously prescribed medications as directed unless otherwise instructed.  Take Tylenol 650mg (Two 325 mg pills) every 4-6 hours as needed for pain or fevers.  Take Zofran 4 mg ODT every 8 hours as needed for nausea and vomiting.  Follow up with your primary care physician in 48-72 hours- bring copies of your results.  Return to the ER for worsening or persistent symptoms, including but limited to worsening/persistent pain, fevers, vomiting, bloody stools, and/or ANY NEW OR CONCERNING SYMPTOMS. If you have issues obtaining follow up, please call: [phone] to obtain a doctor or specialist who takes your insurance in your area.  You may call [phone] to make an appointment with the internal medicine clinic.

## 2018-12-20 NOTE — ED PROVIDER NOTE - ATTENDING CONTRIBUTION TO CARE
I agree with the above H&P.  Briefly this is a 38 year old with n/v/d x 3 days. well appearing. no abd pain. lukely gastro. will check labs, hydrate anti emetics. ct to rule out appy vs gb disease vs opbstruction vs colitis

## 2018-12-20 NOTE — ED PROVIDER NOTE - MEDICAL DECISION MAKING DETAILS
38M w/ 3 days n/v/d now with abd pain. likely all gastroenteritis, but given abd pain and tachycardia will eval for appy. labs, ct, fluids and symptoms relief.

## 2018-12-20 NOTE — ED PROVIDER NOTE - OBJECTIVE STATEMENT
Carey Aggarwal M.D: 38M hx asthma p/w 3 days n/v/d (NBNB) now associated with chills and periumbilical abd pain. pain started today, is sharp intermittent. pt also feels weak and dehydrated. has not been able to keep food down since symptoms began.

## 2018-12-20 NOTE — ED ADULT TRIAGE NOTE - CHIEF COMPLAINT QUOTE
Pt c/o stomach upset - abdominal discomfort, nausea/vomiting/diarrhea/feeling febrile x3 days.  States began after eating a meal so might be food poisoning. Denies sick contacts. PMHx Asthma. Denies SOB/CP.

## 2018-12-20 NOTE — ED ADULT NURSE NOTE - NSIMPLEMENTINTERV_GEN_ALL_ED
Implemented All Fall Risk Interventions:  [place] to call system. Call bell, personal items and telephone within reach. Instruct patient to call for assistance. Room bathroom lighting operational. Non-slip footwear when patient is off stretcher. Physically safe environment: no spills, clutter or unnecessary equipment. Stretcher in lowest position, wheels locked, appropriate side rails in place. Provide visual cue, wrist band, yellow gown, etc. Monitor gait and stability. Monitor for mental status changes and reorient to person, place, and time. Review medications for side effects contributing to fall risk. Reinforce activity limits and safety measures with patient and family.

## 2018-12-20 NOTE — ED PROVIDER NOTE - PHYSICAL EXAMINATION
Carey Aggarwal M.D.:   patient awake alert seen lying on stretcher tired appearing.   LUNGS CTAB no wheeze no crackle.   CARD tachycardic no m/r/g.    Abdomen soft ttp periumbilically ND no rebound no guarding no CVA tenderness.   EXT WWP no edema no calf tenderness CV 2+DP/PT bilaterally.   neuro A&Ox3 gait normal.    skin warm and dry no rash  HEENT: dry mucous membranes, PERRL, EOMI

## 2018-12-21 VITALS
RESPIRATION RATE: 18 BRPM | OXYGEN SATURATION: 100 % | HEART RATE: 92 BPM | DIASTOLIC BLOOD PRESSURE: 85 MMHG | SYSTOLIC BLOOD PRESSURE: 124 MMHG

## 2018-12-21 LAB
ALBUMIN SERPL ELPH-MCNC: 3.7 G/DL — SIGNIFICANT CHANGE UP (ref 3.3–5)
ALP SERPL-CCNC: 71 U/L — SIGNIFICANT CHANGE UP (ref 40–120)
ALT FLD-CCNC: 17 U/L — SIGNIFICANT CHANGE UP (ref 4–41)
AST SERPL-CCNC: 25 U/L — SIGNIFICANT CHANGE UP (ref 4–40)
BILIRUB SERPL-MCNC: 0.6 MG/DL — SIGNIFICANT CHANGE UP (ref 0.2–1.2)
BUN SERPL-MCNC: 15 MG/DL — SIGNIFICANT CHANGE UP (ref 7–23)
CALCIUM SERPL-MCNC: 9.3 MG/DL — SIGNIFICANT CHANGE UP (ref 8.4–10.5)
CHLORIDE SERPL-SCNC: 98 MMOL/L — SIGNIFICANT CHANGE UP (ref 98–107)
CO2 SERPL-SCNC: 19 MMOL/L — LOW (ref 22–31)
CREAT SERPL-MCNC: 1.02 MG/DL — SIGNIFICANT CHANGE UP (ref 0.5–1.3)
GLUCOSE SERPL-MCNC: 93 MG/DL — SIGNIFICANT CHANGE UP (ref 70–99)
MAGNESIUM SERPL-MCNC: 2.1 MG/DL — SIGNIFICANT CHANGE UP (ref 1.6–2.6)
POTASSIUM SERPL-MCNC: 4 MMOL/L — SIGNIFICANT CHANGE UP (ref 3.5–5.3)
POTASSIUM SERPL-SCNC: 4 MMOL/L — SIGNIFICANT CHANGE UP (ref 3.5–5.3)
PROT SERPL-MCNC: 7.5 G/DL — SIGNIFICANT CHANGE UP (ref 6–8.3)
SODIUM SERPL-SCNC: 133 MMOL/L — LOW (ref 135–145)

## 2018-12-21 PROCEDURE — 74177 CT ABD & PELVIS W/CONTRAST: CPT | Mod: 26

## 2018-12-21 RX ORDER — ONDANSETRON 8 MG/1
1 TABLET, FILM COATED ORAL
Qty: 21 | Refills: 0 | OUTPATIENT
Start: 2018-12-21 | End: 2018-12-27

## 2019-01-06 ENCOUNTER — EMERGENCY (EMERGENCY)
Facility: HOSPITAL | Age: 39
LOS: 1 days | Discharge: HOME | End: 2019-01-06
Admitting: EMERGENCY MEDICINE
Payer: SELF-PAY

## 2019-01-06 VITALS
TEMPERATURE: 98 F | OXYGEN SATURATION: 98 % | DIASTOLIC BLOOD PRESSURE: 90 MMHG | SYSTOLIC BLOOD PRESSURE: 137 MMHG | RESPIRATION RATE: 18 BRPM | HEART RATE: 88 BPM

## 2019-01-06 PROCEDURE — 99284 EMERGENCY DEPT VISIT MOD MDM: CPT

## 2019-01-06 PROCEDURE — 71046 X-RAY EXAM CHEST 2 VIEWS: CPT | Mod: 26

## 2019-01-06 RX ORDER — IPRATROPIUM/ALBUTEROL SULFATE 18-103MCG
3 AEROSOL WITH ADAPTER (GRAM) INHALATION ONCE
Qty: 0 | Refills: 0 | Status: COMPLETED | OUTPATIENT
Start: 2019-01-06 | End: 2019-01-06

## 2019-01-06 RX ORDER — SODIUM CHLORIDE 9 MG/ML
1000 INJECTION INTRAMUSCULAR; INTRAVENOUS; SUBCUTANEOUS ONCE
Qty: 0 | Refills: 0 | Status: COMPLETED | OUTPATIENT
Start: 2019-01-06 | End: 2019-01-06

## 2019-01-06 RX ORDER — ALBUTEROL 90 UG/1
2 AEROSOL, METERED ORAL
Qty: 1 | Refills: 0 | OUTPATIENT
Start: 2019-01-06 | End: 2019-02-04

## 2019-01-06 RX ADMIN — Medication 125 MILLIGRAM(S): at 14:27

## 2019-01-06 RX ADMIN — Medication 3 MILLILITER(S): at 14:27

## 2019-01-06 RX ADMIN — SODIUM CHLORIDE 1000 MILLILITER(S): 9 INJECTION INTRAMUSCULAR; INTRAVENOUS; SUBCUTANEOUS at 14:27

## 2019-01-06 NOTE — ED ADULT TRIAGE NOTE - CHIEF COMPLAINT QUOTE
Pt complaining of SOB since 6 AM. Pt has a hx of Asthma. Pt states he has not had relief with his inhaler. Pt denies chest pain, n/v/d, fever or chills.

## 2019-01-06 NOTE — ED PROVIDER NOTE - CARE PLAN
Principal Discharge DX:	Asthma exacerbation  Assessment and plan of treatment:	Advance activity as tolerated.  Continue all previously prescribed medications as directed unless otherwise instructed.  Follow up with your primary care physician in 48-72 hours- bring copies of your results.  Return to the ER for worsening or persistent symptoms, and/or ANY NEW OR CONCERNING SYMPTOMS. If you have issues obtaining follow up, please call: [phone] to obtain a doctor or specialist who takes your insurance in your area.  You may call [phone] to make an appointment with the internal medicine clinic.

## 2019-01-06 NOTE — ED PROVIDER NOTE - NSFOLLOWUPINSTRUCTIONS_ED_ALL_ED_FT
Advance activity as tolerated.  Continue all previously prescribed medications as directed unless otherwise instructed.  Follow up with your primary care physician in 48-72 hours- bring copies of your results.  Return to the ER for worsening or persistent symptoms, and/or ANY NEW OR CONCERNING SYMPTOMS. If you have issues obtaining follow up, please call: [phone] to obtain a doctor or specialist who takes your insurance in your area.  You may call [phone] to make an appointment with the internal medicine clinic.

## 2019-01-06 NOTE — ED PROVIDER NOTE - PLAN OF CARE
Advance activity as tolerated.  Continue all previously prescribed medications as directed unless otherwise instructed.  Follow up with your primary care physician in 48-72 hours- bring copies of your results.  Return to the ER for worsening or persistent symptoms, and/or ANY NEW OR CONCERNING SYMPTOMS. If you have issues obtaining follow up, please call: [phone] to obtain a doctor or specialist who takes your insurance in your area.  You may call [phone] to make an appointment with the internal medicine clinic.

## 2019-01-06 NOTE — ED PROVIDER NOTE - OBJECTIVE STATEMENT
39yo M w/ pmhx of asthma - requiring BIPAP in Oct 18' presents to the ED c/o sob which began this AM. Pt used nebulizer x 1 and MDI x 12 puffs today, also took 1 pill of Prednisone and 1 pill of Singulair. Pt denies recent illness, chest pain, n/v/d, fever or chills.

## 2019-01-06 NOTE — ED PROVIDER NOTE - NSFOLLOWUPCLINICS_GEN_ALL_ED_FT
[facility]  Pulmonology  [address] [place] [address]  Phone: [phone]  Fax:   Follow Up Time:

## 2021-02-18 NOTE — DISCHARGE NOTE ADULT - PRINCIPAL DIAGNOSIS
Patient:   ISSAC MARIE            MRN: [id number]            FIN: [id number]               Age:   66 years     Sex:  Male     :  1952   Associated Diagnoses:   None   Author:   Laila Oliveira      Basic Information   Time seen: Date 2018.   History source: Patient.   Arrival mode: Private vehicle.   History limitation: None.      History of Present Illness   66-year-old male with a past medical history of type 2 diabetes, hypertension, CVA with right-sided weakness, hyperlipidemia, CKD presents the emergency department with a complaint of right foot pain which is chronic in nature.  Patient was admitted in  for an infection maggot infestation.  Patient was recently released from a nursing home approximately a week ago.  Patient has not seen his primary care physician since then.  Patient had at home health nurse check his wound today.  Patient denies taking any medications for symptoms.  Denies any fever, headache, neck pain, chest pain, shortness of breath, abdominal pain, nausea, vomiting, Back/flank pain, urinary symptoms, any other symptoms at this time.          Review of Systems   Constitutional symptoms:  No fever,    Skin symptoms:  No rash,    Eye symptoms:  No recent vision problems,    ENMT symptoms:  No sore throat,    Respiratory symptoms:  No shortness of breath,    Cardiovascular symptoms:  No chest pain,    Gastrointestinal symptoms:  No abdominal pain,    Genitourinary symptoms:  No dysuria,    Musculoskeletal symptoms:  No Joint pain,    Neurologic symptoms:  No headache,    Psychiatric symptoms:  Negative except as documented in HPI.   Endocrine symptoms:  Negative except as documented in HPI.   Allergy/immunologic symptoms:  Negative except as documented in HPI.      Health Status   Allergies: No known allergies.   Immunizations: Up to date.      Past Medical/ Family/ Social History     Past Medical/ Family/ Social History      Medical history     PAST MEDICAL HISTORY:   Significant for,  1. Type 2 diabetes mellitus.  2. Hypertension.  3. History of CVA with right-sided weakness.  4. Hyperlipidemia.  5. Chronic kidney disease.  6. Nicotine dependence.    PAST SURGICAL HISTORY:  Significant for left hand fistula that was done recently.  Other than that, he denies any other surgeries.  .   Surgical history: [facility] records reviewed.   Family history: Not significant.   Social history: Alcohol use: Denies, Tobacco use: Denies, Drug use: Denies.          Physical Examination               Vital Signs   Vital Signs   9/1/2018 1:21            Temperature Tympanic      98.5 F                             Peripheral Pulse Rate     67 bpm                             Respiratory Rate          20 br/min                             Systolic Blood Pressure   118 mmHg                             Diastolic Blood Pressure  69 mmHg                             Mean Arterial Pressure    85 mmHg                             Oxygen Saturation         98 %  .               Per nurse's notes.   Pulse Ox 98% on Room Air which is normal for this patient.   Vital Signs were reviewed.   General:  Alert, no acute distress.    Skin:  Warm, dry.    Head:  Normocephalic, atraumatic.    Neck:  Supple, trachea midline.    Eye:  Pupils are equal, round and reactive to light, extraocular movements are intact.    Ears, nose, mouth and throat:  Oral mucosa moist.   Cardiovascular:  Regular rate and rhythm, S1, S2.    Respiratory:  Lungs are clear to auscultation, respirations are non-labored, Symmetrical chest wall expansion.    Gastrointestinal:  Soft, Nontender, Non distended.    Neurological:  Alert and oriented to person, place, time, and situation, No focal neurological deficit observed, CN II-XII intact, normal sensory observed, normal motor observed, normal speech observed, normal coordination observed.    Psychiatric:  Appropriate mood & affect.      Medical Decision Making   Differential Diagnosis:  Foot sprain,  foot fracture, contusion, abrasion, hematoma, cellulitis.    Results review:  Lab results : Lab View   9/1/2018 2:00            Glucose Lvl               224 mg/dL  HI                             BUN                       25 mg/dL  HI                             Creatinine                6.06 mg/dL  HI                             eGFR AfrAmer              11 mL/min/1.73m2  NA                             eGFR NonAfrAmer           9 mL/min/1.73m2  NA                             Sodium                    139 mEq/L                             Potassium                 3.9 mEq/L                             Chloride                  94 mEq/L  LOW                             TCO2                      33 mEq/L  HI                             AGAP                      16 mEq/L                             Calcium                   9.8 mg/dL                             Alk Phos                  87 unit/L                             Bili Total                0.2 mg/dL                             Total Protein             7.6 g/dL                             Albumin                   3.4 g/dL  LOW                             Globulin_                 4.2 g/dL  HI                             A/G Ratio_                0.8  NA                             AST/GOT                   12 unit/L                             ALT/GPT                   12 unit/L                             WBC                       7.9 K/cumm                             RBC                       4.07 M/cumm  LOW                             Hgb                       11.5 g/dL  LOW                             Hct                       36 %                             MCV                       88 FL                             MCH                       28 pg                             MCHC                      32 g/dL                             RDW                       14.8 %  HI                             Platelet                  200 K/cumm                              NRBC                      0.0 %                             Abs Neutro                4.9 K/cumm                             Neutrophil                62 %  NA                             Abs Lymph                 2.1 K/cumm                             Lymphocyte                26 %  NA                             Abs Mono                  0.7 K/cumm                             Monocyte                  9 %  NA                             Immature Gran             0.4 %  HI                             Eosinophil                2 %                             Basophil                  1 %  .   Radiology results:  X-ray, neg for acute findings per MD.    66-year-old male with a past medical history of type 2 diabetes, hypertension, CVA with right-sided weakness, hyperlipidemia, CKD presents the emergency department with a complaint of right foot pain which is chronic in nature.  Patient was admitted in June for an infection maggot infestation.  Patient was recently released from a nursing home approximately a week ago.  Patient has not seen his primary care physician since then.  Patient had at home health nurse check his wound today.  Patient denies taking any medications for symptoms.  Denies any fever, headache, neck pain, chest pain, shortness of breath, abdominal pain, nausea, vomiting, Back/flank pain, urinary symptoms, any other symptoms at this time.  Vitals and physical exam as noted above.  Patient is afebrile there is an open ulcer to the posterior aspect of his heel, no streaking erythema however there is some purulent drainage.  Wound cultures obtained.  Patient given IV Clinda.  Labs as noted above.  No leukocytosis, hemodynamically stable, labs are all stable from previous visits.  Case discussed with Dr. Sow, ciprofloxacin and Bactrim.  Discussed treatment plan with patient.  Follow-up with primary care physician 1 or 2 days.  Return for worsening symptoms.      Impression and Plan    Diagnosis   Right Foot Pain  Right Heel Ulceration   Plan   Condition: Improved, Stable.    Disposition: Discharged: to home.    Prescriptions: Launch prescriptions   Pharmacy:  [facility] 325 mg-5 mg oral tablet (Prescribe): 1 tab, PO, q4hr, for 2 day, PRN: for pain, 12 tab, 0 Refill(s)  Bactrim DS oral tablet (Prescribe): 1 tab, PO, BID, for 7 day, 14 tab, 0 Refill(s)  Cipro 500 mg oral tablet (Prescribe): 500 mg, 1 tab(s), PO, q12hr, for 7 day(s), 14 tab(s), 0 Refill(s).    Patient was given the following educational materials: Cellulitis, Easy-to-Read.    Follow up with: Follow up with primary care provider Take medication as prescribed  Follow-up with primary care physician in 1 or 2 days  Return for worsening symptoms.         Moderate asthma with exacerbation, unspecified whether persistent

## 2021-04-04 NOTE — ED PROVIDER NOTE - PROGRESS NOTE DETAILS
soft JEANETTE KOROMA:  Pt notes significant improvement in abdominal pain.  CT negative for acute pathology.  Pt tolerating PO.  PT requesting to go home.  Pt medically stable for discharge. Pt to follow up with PMD.

## 2021-05-25 NOTE — ED ADULT TRIAGE NOTE - MODE OF ARRIVAL
Dominic Zuniga is a 31 y.o. male who was seen by synchronous (real-time) audio-video technology on 5/25/2021 for Anxiety (no ideas of suicide or homicide.  ) and Arthritis        Assessment & Plan:   Diagnoses and all orders for this visit:    1. Anxiety  -     LORazepam (ATIVAN) 0.5 mg tablet; Take 1 Tablet by mouth two (2) times daily as needed for Anxiety. Max Daily Amount: 1 mg.    2. Other depression  -     escitalopram oxalate (LEXAPRO) 10 mg tablet; TAKE ONE TABLET BY MOUTH DAILY          712  Subjective:       Prior to Admission medications    Medication Sig Start Date End Date Taking? Authorizing Provider   methocarbamol (ROBAXIN) 500 mg tablet Take 1 Tab by mouth four (4) times daily. 2/11/20   Carlito Tracy MD   meloxicam (MOBIC) 15 mg tablet Take 1 Tab by mouth daily. 11/19/19   Carlito Tracy MD   escitalopram oxalate (LEXAPRO) 10 mg tablet TAKE ONE TABLET BY MOUTH DAILY 2/7/19   Carlito Tracy MD     Patient Active Problem List   Diagnosis Code    Pollen allergies Z91.09    Scoliosis M41.9    Depression F32.9    Other hyperlipidemia E78.49    Abnormal thyroid blood test R79.89    Non-seasonal allergic rhinitis due to pollen J30.1     Past Medical History:   Diagnosis Date    Chronic ear infection     Knee pain     right knee pain     Scoliosis     curvature of spine       Review of Systems   Constitutional: Negative for chills and fever. Respiratory: Negative for cough. Cardiovascular: Negative for chest pain. Musculoskeletal: Positive for joint pain. Neurological: Negative. Psychiatric/Behavioral: Negative. Negative for depression, hallucinations, memory loss, substance abuse and suicidal ideas. The patient is not nervous/anxious and does not have insomnia. Objective:   No flowsheet data found.    General: alert, cooperative, no distress   Mental  status: normal mood, behavior, speech, dress, motor activity, and thought processes, able to follow commands   HENT: NCAT   Neck: no visualized mass   Resp: no respiratory distress   Neuro: no gross deficits   Skin: no discoloration or lesions of concern on visible areas   Psychiatric: normal affect, consistent with stated mood, no evidence of hallucinations     Additional exam findings: We discussed the expected course, resolution and complications of the diagnosis(es) in detail. Medication risks, benefits, costs, interactions, and alternatives were discussed as indicated. I advised him to contact the office if his condition worsens, changes or fails to improve as anticipated. He expressed understanding with the diagnosis(es) and plan. Matthew Adore, was evaluated through a synchronous (real-time) audio-video encounter. The patient (or guardian if applicable) is aware that this is a billable service. Verbal consent to proceed has been obtained within the past 12 months. The visit was conducted pursuant to the emergency declaration under the [facility], [address] [place] [address] authority and the [employer] General Act. Patient identification was verified, and a caregiver was present when appropriate. The patient was located in a state where the provider was credentialed to provide care.     Yara Rand MD Walk in

## 2021-08-02 NOTE — ED ADULT TRIAGE NOTE - NS ED NURSE DIRECT TO ROOM YN
No airway patent/breath sounds equal/good air movement/respirations non-labored/clear to auscultation bilaterally/no chest wall tenderness/no intercostal retractions/no rales/no rhonchi/no subcutaneous emphysema/no wheezes airway patent/breath sounds equal/good air movement/respirations non-labored/clear to auscultation bilaterally/no rhonchi/no wheezes

## 2021-10-27 NOTE — PATIENT PROFILE ADULT - FUNCTIONAL SCREEN CURRENT LEVEL: SWALLOWING (IF SCORE 2 OR MORE FOR ANY ITEM, CONSULT REHAB SERVICES), MLM)
[FreeTextEntry1] : slowly losing weight by exercise +diet. no cp/sob/edema 0 = swallows foods/liquids without difficulty

## 2021-11-13 NOTE — ED PROVIDER NOTE - NS_EDPROVIDERDISPOUSERTYPE_ED_A_ED
Attending Attestation (For Attendings USE Only)...
PROVIDER:[TOKEN:[[id number]],SCHEDULEDAPPT:[11/18/2021],SCHEDULEDAPPTTIME:[09:15 AM]],FREE:[LAST:[Russell],FIRST:[Taylor],PHONE:[phone]],FAX:[(   )    -],ADDRESS:[[address] [place] [address]],SCHEDULEDAPPT:[11/30/2021],SCHEDULEDAPPTTIME:[12:00 AM]]

## 2022-09-14 ENCOUNTER — RESULT REVIEW (OUTPATIENT)
Age: 42
End: 2022-09-14